# Patient Record
Sex: MALE | Race: WHITE | Employment: FULL TIME
[De-identification: names, ages, dates, MRNs, and addresses within clinical notes are randomized per-mention and may not be internally consistent; named-entity substitution may affect disease eponyms.]

---
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
## 2019-08-12 ENCOUNTER — APPOINTMENT (OUTPATIENT)
Dept: GENERAL RADIOLOGY | Facility: CLINIC | Age: 19
End: 2019-08-12
Attending: EMERGENCY MEDICINE
Payer: OTHER MISCELLANEOUS

## 2019-08-12 ENCOUNTER — HOSPITAL ENCOUNTER (EMERGENCY)
Facility: CLINIC | Age: 19
Discharge: HOME | End: 2019-08-12
Attending: EMERGENCY MEDICINE | Admitting: EMERGENCY MEDICINE
Payer: OTHER MISCELLANEOUS

## 2019-08-12 VITALS
TEMPERATURE: 98.4 F | HEART RATE: 94 BPM | DIASTOLIC BLOOD PRESSURE: 92 MMHG | OXYGEN SATURATION: 99 % | RESPIRATION RATE: 16 BRPM | SYSTOLIC BLOOD PRESSURE: 164 MMHG | WEIGHT: 220 LBS

## 2019-08-12 DIAGNOSIS — S69.91XA INJURY OF FINGER OF RIGHT HAND, INITIAL ENCOUNTER: ICD-10-CM

## 2019-08-12 DIAGNOSIS — S62.639B OPEN FRACTURE OF TUFT OF DISTAL PHALANX OF FINGER: ICD-10-CM

## 2019-08-12 DIAGNOSIS — S61.210A LACERATION OF RIGHT INDEX FINGER WITHOUT FOREIGN BODY, NAIL DAMAGE STATUS UNSPECIFIED, INITIAL ENCOUNTER: ICD-10-CM

## 2019-08-12 PROCEDURE — 26755 TREAT FINGER FRACTURE EACH: CPT | Mod: 54 | Performed by: EMERGENCY MEDICINE

## 2019-08-12 PROCEDURE — 90471 IMMUNIZATION ADMIN: CPT | Performed by: EMERGENCY MEDICINE

## 2019-08-12 PROCEDURE — 90715 TDAP VACCINE 7 YRS/> IM: CPT | Performed by: EMERGENCY MEDICINE

## 2019-08-12 PROCEDURE — 26755 TREAT FINGER FRACTURE EACH: CPT | Mod: F6 | Performed by: EMERGENCY MEDICINE

## 2019-08-12 PROCEDURE — 25000128 H RX IP 250 OP 636: Performed by: EMERGENCY MEDICINE

## 2019-08-12 PROCEDURE — 99284 EMERGENCY DEPT VISIT MOD MDM: CPT | Mod: 25 | Performed by: EMERGENCY MEDICINE

## 2019-08-12 PROCEDURE — 73140 X-RAY EXAM OF FINGER(S): CPT | Mod: RT

## 2019-08-12 RX ORDER — CEPHALEXIN 500 MG/1
500 CAPSULE ORAL 4 TIMES DAILY
Qty: 28 CAPSULE | Refills: 0 | Status: SHIPPED | OUTPATIENT
Start: 2019-08-12 | End: 2019-08-21

## 2019-08-12 RX ORDER — HYDROCODONE BITARTRATE AND ACETAMINOPHEN 5; 325 MG/1; MG/1
1 TABLET ORAL EVERY 6 HOURS PRN
Qty: 18 TABLET | Refills: 0 | Status: SHIPPED | OUTPATIENT
Start: 2019-08-12 | End: 2019-08-21

## 2019-08-12 RX ADMIN — CLOSTRIDIUM TETANI TOXOID ANTIGEN (FORMALDEHYDE INACTIVATED), CORYNEBACTERIUM DIPHTHERIAE TOXOID ANTIGEN (FORMALDEHYDE INACTIVATED), BORDETELLA PERTUSSIS TOXOID ANTIGEN (GLUTARALDEHYDE INACTIVATED), BORDETELLA PERTUSSIS FILAMENTOUS HEMAGGLUTININ ANTIGEN (FORMALDEHYDE INACTIVATED), BORDETELLA PERTUSSIS PERTACTIN ANTIGEN, AND BORDETELLA PERTUSSIS FIMBRIAE 2/3 ANTIGEN 0.5 ML: 5; 2; 2.5; 5; 3; 5 INJECTION, SUSPENSION INTRAMUSCULAR at 13:53

## 2019-08-12 NOTE — ED AVS SNAPSHOT
[facility]  [address] [address]  Phone:  [phone]  Fax:  [phone]                                    Wang Marcano   MRN: [id number]    Department:  [facility]   Date of Visit:  8/12/2019           After Visit Summary Signature Page    I have received my discharge instructions, and my questions have been answered. I have discussed any challenges I see with this plan with the nurse or doctor.    ..........................................................................................................................................  Patient/Patient Representative Signature      ..........................................................................................................................................  Patient Representative Print Name and Relationship to Patient    ..................................................               ................................................  Date                                   Time    ..........................................................................................................................................  Reviewed by Signature/Title    ...................................................              ..............................................  Date                                               Time          22EPIC Rev 08/18

## 2019-08-12 NOTE — ED PROVIDER NOTES
History     Chief Complaint   Patient presents with     Laceration     right hand digit #2 crushed at work at 1200     HPI  Wang Marcano is a 19 year old right-hand-dominant male who sustained a crush injury to his distal right index finger while at work resulting in laceration.  He was working on a well when his hand was struck with some of the moving metallic parts.  His finger was not entrapped.  Denies any other injuries.  Was in his usual state of health prior to the accident.  Denies any sniffing a past medical history, not taking medications, no known drug allergies.    Allergies:  Allergies   Allergen Reactions     No Known Drug Allergies        Problem List:    There are no active problems to display for this patient.       Past Medical History:    No past medical history on file.    Past Surgical History:    No past surgical history on file.    Family History:    No family history on file.    Social History:  Marital Status:  Single [1]  Social History     Tobacco Use     Smoking status: Never Smoker     Smokeless tobacco: Never Used   Substance Use Topics     Alcohol use: Not on file     Drug use: Not on file        Medications:      cephALEXin (KEFLEX) 500 MG capsule   HYDROcodone-acetaminophen (NORCO) 5-325 MG tablet         Review of Systems  A 10 point review of systems was performed and otherwise negative except as mentioned in HPI.     Physical Exam   BP: (!) 164/92  Pulse: 94  Temp: 98.4  F (36.9  C)  Resp: 16  Weight: 99.8 kg (220 lb)  SpO2: 99 %      Physical Exam  GEN: Awake, alert, and cooperative. No acute distress  HENT: Atraumatic  EYES: EOM intact. Conjunctiva clear.  CV :Regular rate and rhythm  PULM: Normal effort.  Lead  NEURO: No sensory deficit in right hand.  Two-point discrimination intact to all fingertips.  EXT:  3 cm curvilinear laceration from radial tip of long finger extending along lateral nail bed proximally and then along volar aspect of DIP. Soft tissue disruption.  Flexion and extension against resistance at PIP and DIP with isolation.           ED Course        [facility]    Laceration repair  Date/Time: 8/15/2019 11:06 AM  Performed by: Yuri Phillips MD  Authorized by: Yuri Phillips MD     ED EVALUATION:      I have performed an Emergency Department Evaluation including taking a history and physical examination, this evaluation will be documented in the electronic medical record for this ED encounter.      Difficult Airway?: No    ASA Class: Class 1- healthy patient    NPO Status: appropriately NPO for procedure  UNIVERSAL PROTOCOL   Site Marked: NA  Prior Images Obtained and Reviewed:  NA  Required items: Required blood products, implants, devices and special equipment available    Patient identity confirmed:  Verbally with patient, arm band, provided demographic data and hospital-assigned identification number  Patient was reevaluated immediately before administering moderate or deep sedation or anesthesia  Confirmation Checklist:  Patient's identity using two indicators, relevant allergies, procedure was appropriate and matched the consent or emergent situation and correct equipment/implants were available  Time out: Immediately prior to the procedure a time out was called    Preparation: Patient was prepped and draped in usual sterile fashion      ANESTHESIA (see MAR for exact dosages):     Anesthesia method:  Nerve block    Block location:  Right long finger    Block needle gauge:  27 G    Block anesthetic:  Bupivacaine 0.25% w/o epi    Block technique:  Digital    Block injection procedure:  Anatomic landmarks identified, introduced needle, incremental injection, negative aspiration for blood and anatomic landmarks palpated    Block outcome:  Anesthesia achieved        SEDATION    Patient Sedated: No    LACERATION DETAILS     Location:  Finger    Finger location:  R index finger    Length (cm):  3    Depth (mm):  10    REPAIR  TYPE:     Repair type:  Simple      EXPLORATION:     Hemostasis achieved with:  Direct pressure    Wound exploration: wound explored through full range of motion and entire depth of wound probed and visualized      Wound extent: underlying fracture      Wound extent: no foreign body      Contaminated: no      TREATMENT:     Amount of cleaning:  Extensive    Irrigation solution:  Tap water and sterile saline    Irrigation volume:  500    Irrigation method:  Syringe and tap    Visualized foreign bodies/material removed: no      SKIN REPAIR     Repair method:  Sutures    Suture size:  4-0    Suture material:  Nylon    Suture technique:  Simple interrupted    Number of sutures:  10    APPROXIMATION     Approximation:  Loose    POST-PROCEDURE DETAILS     Dressing:  Antibiotic ointment, non-adherent dressing, splint for protection, sterile dressing and tube gauze      PROCEDURE   Patient Tolerance:  Patient tolerated the procedure well with no immediate complications    Time of Sedation in Minutes by Physician:  0                 Critical Care time:  none               No results found for this or any previous visit (from the past 24 hour(s)).    Medications   Tdap (tetanus-diphtheria-acell pertussis) (ADACEL) injection 0.5 mL (0.5 mLs Intramuscular Given 8/12/19 6971)       Assessments & Plan (with Medical Decision Making)   19 year old right hand dominant male with work related injury to long finger of right hand. Imaging notable for open comminuted fracture of distal phalanx. Wound thoroughly irrigated and explored, closed primarily. Finger splinted in extension and will need to follow up with orthopedics within one week. Referral placed. Tetanus updated. Cephalexin for empiric coverage. Norco for breakthrough pain. Wound care and return precautions discussed.     I have reviewed the nursing notes.    I have reviewed the findings, diagnosis, plan and need for follow up with the patient.       Discharge Medication List  as of 8/13/2019  4:19 PM      START taking these medications    Details   cephALEXin (KEFLEX) 500 MG capsule Take 1 capsule (500 mg) by mouth 4 times daily for 7 days, Disp-28 capsule, R-0, E-Prescribe      HYDROcodone-acetaminophen (NORCO) 5-325 MG tablet Take 1 tablet by mouth every 6 hours as needed for pain, Disp-18 tablet, R-0, Local Print             Final diagnoses:   Injury of finger of right hand, initial encounter   Open fracture of tuft of distal phalanx of finger   Laceration of right index finger without foreign body, nail damage status unspecified, initial encounter     Yuri Phillips MD           8/12/2019   [facility] EMERGENCY DEPARTMENT     Yuri Phillips MD  08/15/19 [id number]

## 2019-08-12 NOTE — DISCHARGE INSTRUCTIONS
You will need to follow-up with orthopedic specialist in 1 to 3 days.  Take Keflex as directed, be sure to take the complete prescription.  Keep your finger clean and dry and elevate when possible.  Take acetaminophen for pain and she may take Norco for breakthrough pain.  If your pain becomes severe, you have increasing swelling, or redness, or discharge from the wound please return to the emergency room immediately for further evaluation.  Do not drive a vehicle or operate heavy machinery while taking narcotic pain medications.

## 2019-08-12 NOTE — ED NOTES
Patient crushed right pointer finger while at work today.  Has avulsion injury to finger tip of finger to middle joint.  Stated placed skin back together.  CMS good  Swelling noted.  Blood under nail.

## 2019-08-20 NOTE — PROGRESS NOTES
"SUBJECTIVE:   Wang Marcano is a 19 year old male who is seen per [facility] ED department for evaluation of a work related right index finger crush injury that occurred on 8/12/2019.     Mechanism of injury:.  He was working on a well when his hand was struck with some of the moving metallic parts.    Present symptoms: pain if finger is touched.    Treatments tried to this point: Finger wound was irrigated, and laceration was closed.  He was placed on 500 mg of Keflex 4 times a day x7 days    Review of Systems:  Constitutional:  NEGATIVE for fever, chills, change in weight  Integumentary/Skin:  NEGATIVE for worrisome rashes, moles or lesions  Eyes:  NEGATIVE for vision changes or irritation  ENT/Mouth:  NEGATIVE for ear, mouth and throat problems  Resp:  NEGATIVE for significant cough or SOB  Breast:  NEGATIVE for masses, tenderness or discharge  CV:  NEGATIVE for chest pain, palpitations or peripheral edema  GI:  NEGATIVE for nausea, abdominal pain, heartburn, or change in bowel habits  :  Negative   Musculoskeletal:  See HPI above  Neuro:  NEGATIVE for weakness, dizziness or paresthesias  Endocrine:  NEGATIVE for temperature intolerance, skin/hair changes  Heme/allergy/immune:  NEGATIVE for bleeding problems  Psychiatric:  NEGATIVE for changes in mood or affect    Past Medical History: No past medical history on file.  Past Surgical History: No past surgical history on file.  Family History: No family history on file.  Social History:   Social History     Tobacco Use     Smoking status: Never Smoker     Smokeless tobacco: Never Used   Substance Use Topics     Alcohol use: Not on file     OBJECTIVE:  Physical Exam:  /82 (BP Location: Left arm, Patient Position: Sitting, Cuff Size: Adult Regular)   Pulse 83   Ht 1.956 m (6' 5\")   Wt 99.8 kg (220 lb)   SpO2 97%   BMI 26.09 kg/m    General Appearance: healthy, alert and no distress   Skin: no suspicious lesions or rashes  Neuro: Normal strength and tone, " mentation intact and speech normal  Vascular: good pulses, and cappillary refill   Lymph: no lymphadenopathy   Psych:  mentation appears normal and affect normal/bright  Resp: no increased work of breathing     Right Hand Exam:  Inspection: index finger tip with laceration up radial side, extending across distal tip, and across ulnar edge of the nail  Some marginal necrosis on flap  No evidence of infection.  Able to  Flex and extend DIP joint.  Tender: very     X-rays:  Comminuted tuft fracture      ASSESSMENT:   Open fracture right index tuft  Doing well    PLAN:   Keep clean and dry. Protect with splint  Return to clinic next week for sutures out.      Return to clinic: next week.    HEATHER Booker MD  Dept. Orthopedic Surgery  [facility]

## 2019-08-21 ENCOUNTER — OFFICE VISIT (OUTPATIENT)
Dept: ORTHOPEDICS | Facility: OTHER | Age: 19
End: 2019-08-21
Payer: OTHER MISCELLANEOUS

## 2019-08-21 VITALS
WEIGHT: 220 LBS | DIASTOLIC BLOOD PRESSURE: 82 MMHG | HEIGHT: 77 IN | BODY MASS INDEX: 25.98 KG/M2 | SYSTOLIC BLOOD PRESSURE: 129 MMHG | OXYGEN SATURATION: 97 % | HEART RATE: 83 BPM

## 2019-08-21 DIAGNOSIS — S62.639B OPEN FRACTURE OF TUFT OF DISTAL PHALANX OF FINGER: Primary | ICD-10-CM

## 2019-08-21 PROCEDURE — 99203 OFFICE O/P NEW LOW 30 MIN: CPT | Performed by: ORTHOPAEDIC SURGERY

## 2019-08-21 SDOH — HEALTH STABILITY: MENTAL HEALTH: HOW OFTEN DO YOU HAVE A DRINK CONTAINING ALCOHOL?: NEVER

## 2019-08-21 ASSESSMENT — MIFFLIN-ST. JEOR: SCORE: 2125.29

## 2019-08-21 NOTE — LETTER
8/21/2019         RE: Wang Marcano  [address] [facility] [address]        Dear Colleague,    Thank you for referring your patient, Wang Marcano, to the [facility]. Please see a copy of my visit note below.    SUBJECTIVE:   Wang Marcano is a 19 year old male who is seen per [facility] ED department for evaluation of a work related right index finger crush injury that occurred on 8/12/2019.     Mechanism of injury:.  He was working on a well when his hand was struck with some of the moving metallic parts.    Present symptoms: pain if finger is touched.    Treatments tried to this point: Finger wound was irrigated, and laceration was closed.  He was placed on 500 mg of Keflex 4 times a day x7 days    Review of Systems:  Constitutional:  NEGATIVE for fever, chills, change in weight  Integumentary/Skin:  NEGATIVE for worrisome rashes, moles or lesions  Eyes:  NEGATIVE for vision changes or irritation  ENT/Mouth:  NEGATIVE for ear, mouth and throat problems  Resp:  NEGATIVE for significant cough or SOB  Breast:  NEGATIVE for masses, tenderness or discharge  CV:  NEGATIVE for chest pain, palpitations or peripheral edema  GI:  NEGATIVE for nausea, abdominal pain, heartburn, or change in bowel habits  :  Negative   Musculoskeletal:  See HPI above  Neuro:  NEGATIVE for weakness, dizziness or paresthesias  Endocrine:  NEGATIVE for temperature intolerance, skin/hair changes  Heme/allergy/immune:  NEGATIVE for bleeding problems  Psychiatric:  NEGATIVE for changes in mood or affect    Past Medical History: No past medical history on file.  Past Surgical History: No past surgical history on file.  Family History: No family history on file.  Social History:   Social History     Tobacco Use     Smoking status: Never Smoker     Smokeless tobacco: Never Used   Substance Use Topics     Alcohol use: Not on file     OBJECTIVE:  Physical Exam:  /82 (BP Location: Left arm, Patient Position: Sitting,  "Cuff Size: Adult Regular)   Pulse 83   Ht 1.956 m (6' 5\")   Wt 99.8 kg (220 lb)   SpO2 97%   BMI 26.09 kg/m     General Appearance: healthy, alert and no distress   Skin: no suspicious lesions or rashes  Neuro: Normal strength and tone, mentation intact and speech normal  Vascular: good pulses, and cappillary refill   Lymph: no lymphadenopathy   Psych:  mentation appears normal and affect normal/bright  Resp: no increased work of breathing     Right Hand Exam:  Inspection: index finger tip with laceration up radial side, extending across distal tip, and across ulnar edge of the nail  Some marginal necrosis on flap  No evidence of infection.  Able to  Flex and extend DIP joint.  Tender: very     X-rays:  Comminuted tuft fracture      ASSESSMENT:   Open fracture right index tuft  Doing well    PLAN:   Keep clean and dry. Protect with splint  Return to clinic next week for sutures out.      Return to clinic: next week.    HEATHER Booker MD  Dept. Orthopedic Surgery  [facility]       Again, thank you for allowing me to participate in the care of your patient.        Sincerely,        Murtaza Booker MD    "

## 2019-08-28 ENCOUNTER — OFFICE VISIT (OUTPATIENT)
Dept: ORTHOPEDICS | Facility: OTHER | Age: 19
End: 2019-08-28
Payer: OTHER MISCELLANEOUS

## 2019-08-28 VITALS
DIASTOLIC BLOOD PRESSURE: 75 MMHG | BODY MASS INDEX: 25.98 KG/M2 | HEART RATE: 67 BPM | SYSTOLIC BLOOD PRESSURE: 131 MMHG | OXYGEN SATURATION: 98 % | WEIGHT: 220 LBS | HEIGHT: 77 IN

## 2019-08-28 DIAGNOSIS — S62.639B OPEN FRACTURE OF TUFT OF DISTAL PHALANX OF FINGER: Primary | ICD-10-CM

## 2019-08-28 PROCEDURE — 99213 OFFICE O/P EST LOW 20 MIN: CPT | Performed by: ORTHOPAEDIC SURGERY

## 2019-08-28 ASSESSMENT — MIFFLIN-ST. JEOR: SCORE: 2125.29

## 2019-08-28 NOTE — LETTER
"    8/28/2019         RE: Wang Marcano  [address] [facility] [address]        Dear Colleague,    Thank you for referring your patient, Wang Marcano, to the [facility]. Please see a copy of my visit note below.    SUBJECTIVE:   Wang Marcano is here for follow up of a work related right index finger crush injury that occurred on 8/12/2019.     Present symptoms: less pain but still pain if finger is touched.  He's done with his antibiotics.    Treatments tried to this point: Finger wound was irrigated, and laceration was closed.  He was placed on 500 mg of Keflex 4 times a day x7 days    Review of Systems:  Constitutional:  NEGATIVE for fever, chills, change in weight  Integumentary/Skin:  NEGATIVE for worrisome rashes, moles or lesions  Eyes:  NEGATIVE for vision changes or irritation  ENT/Mouth:  NEGATIVE for ear, mouth and throat problems  Resp:  NEGATIVE for significant cough or SOB  Breast:  NEGATIVE for masses, tenderness or discharge  CV:  NEGATIVE for chest pain, palpitations or peripheral edema  GI:  NEGATIVE for nausea, abdominal pain, heartburn, or change in bowel habits  :  Negative   Musculoskeletal:  See HPI above  Neuro:  NEGATIVE for weakness, dizziness or paresthesias  Endocrine:  NEGATIVE for temperature intolerance, skin/hair changes  Heme/allergy/immune:  NEGATIVE for bleeding problems  Psychiatric:  NEGATIVE for changes in mood or affect    Past Medical History: No past medical history on file.  Past Surgical History: No past surgical history on file.  Family History: No family history on file.  Social History:   Social History     Tobacco Use     Smoking status: Current Some Day Smoker     Smokeless tobacco: Never Used   Substance Use Topics     Alcohol use: Never     Frequency: Never     OBJECTIVE:  Physical Exam:  /75 (BP Location: Left arm, Patient Position: Sitting, Cuff Size: Adult Large)   Pulse 67   Ht 1.956 m (6' 5\")   Wt 99.8 kg (220 lb)   SpO2 98%  "  BMI 26.09 kg/m     General Appearance: healthy, alert and no distress   Skin: no suspicious lesions or rashes  Neuro: Normal strength and tone, mentation intact and speech normal  Vascular: good pulses, and cappillary refill   Lymph: no lymphadenopathy   Psych:  mentation appears normal and affect normal/bright  Resp: no increased work of breathing     Right Hand Exam:  Inspection: index finger tip with laceration up radial side, extending across distal tip, and across ulnar edge of the nail  Some marginal necrosis on flap--improved  No evidence of infection.  Able to  Flex and extend DIP joint.  Tender: very     X-rays:  Comminuted tuft fracture      ASSESSMENT:   Open fracture right index tuft  Doing well    PLAN:   Keep clean and dry. Protect with splint if he tolerates  sutures out today      Return to clinic: 2 weeks     HEATHER Booker MD  Dept. Orthopedic Surgery  [facility]       Again, thank you for allowing me to participate in the care of your patient.        Sincerely,        Murtaza Booker MD

## 2019-08-28 NOTE — PROGRESS NOTES
"SUBJECTIVE:   Wang Marcano is here for follow up of a work related right index finger crush injury that occurred on 8/12/2019.     Present symptoms: less pain but still pain if finger is touched.  He's done with his antibiotics.    Treatments tried to this point: Finger wound was irrigated, and laceration was closed.  He was placed on 500 mg of Keflex 4 times a day x7 days    Review of Systems:  Constitutional:  NEGATIVE for fever, chills, change in weight  Integumentary/Skin:  NEGATIVE for worrisome rashes, moles or lesions  Eyes:  NEGATIVE for vision changes or irritation  ENT/Mouth:  NEGATIVE for ear, mouth and throat problems  Resp:  NEGATIVE for significant cough or SOB  Breast:  NEGATIVE for masses, tenderness or discharge  CV:  NEGATIVE for chest pain, palpitations or peripheral edema  GI:  NEGATIVE for nausea, abdominal pain, heartburn, or change in bowel habits  :  Negative   Musculoskeletal:  See HPI above  Neuro:  NEGATIVE for weakness, dizziness or paresthesias  Endocrine:  NEGATIVE for temperature intolerance, skin/hair changes  Heme/allergy/immune:  NEGATIVE for bleeding problems  Psychiatric:  NEGATIVE for changes in mood or affect    Past Medical History: No past medical history on file.  Past Surgical History: No past surgical history on file.  Family History: No family history on file.  Social History:   Social History     Tobacco Use     Smoking status: Current Some Day Smoker     Smokeless tobacco: Never Used   Substance Use Topics     Alcohol use: Never     Frequency: Never     OBJECTIVE:  Physical Exam:  /75 (BP Location: Left arm, Patient Position: Sitting, Cuff Size: Adult Large)   Pulse 67   Ht 1.956 m (6' 5\")   Wt 99.8 kg (220 lb)   SpO2 98%   BMI 26.09 kg/m    General Appearance: healthy, alert and no distress   Skin: no suspicious lesions or rashes  Neuro: Normal strength and tone, mentation intact and speech normal  Vascular: good pulses, and cappillary refill   Lymph: no " lymphadenopathy   Psych:  mentation appears normal and affect normal/bright  Resp: no increased work of breathing     Right Hand Exam:  Inspection: index finger tip with laceration up radial side, extending across distal tip, and across ulnar edge of the nail  Some marginal necrosis on flap--improved  No evidence of infection.  Able to  Flex and extend DIP joint.  Tender: very     X-rays:  Comminuted tuft fracture      ASSESSMENT:   Open fracture right index tuft  Doing well    PLAN:   Keep clean and dry. Protect with splint if he tolerates  sutures out today      Return to clinic: 2 weeks     HEATHER Booker MD  Dept. Orthopedic Surgery  [facility]

## 2019-09-06 NOTE — PROGRESS NOTES
"SUBJECTIVE:   Wang Marcano is here for follow up of a work related right index finger crush injury that occurred on 8/12/2019.      Present symptoms: minimal pain now if touched.  He peeled off the necrotic skin layer.      Treatments tried to this point: Finger wound was irrigated, and laceration was closed.  He was placed on 500 mg of Keflex 4 times a day x7 days      Review of Systems:  Constitutional:  NEGATIVE for fever, chills, change in weight  Integumentary/Skin:  NEGATIVE for worrisome rashes, moles or lesions  Eyes:  NEGATIVE for vision changes or irritation  ENT/Mouth:  NEGATIVE for ear, mouth and throat problems  Resp:  NEGATIVE for significant cough or SOB  Breast:  NEGATIVE for masses, tenderness or discharge  CV:  NEGATIVE for chest pain, palpitations or peripheral edema  GI:  NEGATIVE for nausea, abdominal pain, heartburn, or change in bowel habits  :  Negative   Musculoskeletal:  See HPI above  Neuro:  NEGATIVE for weakness, dizziness or paresthesias  Endocrine:  NEGATIVE for temperature intolerance, skin/hair changes  Heme/allergy/immune:  NEGATIVE for bleeding problems  Psychiatric:  NEGATIVE for changes in mood or affect     Past Medical History:   Past Medical History   No past medical history on file.     Past Surgical History:   Past Surgical History   No past surgical history on file.     Family History:   Family History   No family history on file.     Social History:   Social History            Tobacco Use     Smoking status: Current Some Day Smoker     Smokeless tobacco: Never Used   Substance Use Topics     Alcohol use: Never       Frequency: Never      OBJECTIVE:  Physical Exam:  /75 (BP Location: Left arm, Patient Position: Sitting, Cuff Size: Adult Large)   Pulse 67   Ht 1.956 m (6' 5\")   Wt 99.8 kg (220 lb)   SpO2 98%   BMI 26.09 kg/m    General Appearance: healthy, alert and no distress   Skin: no suspicious lesions or rashes  Neuro: Normal strength and tone, mentation " intact and speech normal  Vascular: good pulses, and cappillary refill   Lymph: no lymphadenopathy   Psych:  mentation appears normal and affect normal/bright  Resp: no increased work of breathing      Right Hand Exam:   Inspection: index finger tip with healed laceration up radial side, extending across distal tip, and across ulnar edge of the nail  The marginal necrosis on flap has shed, and now has normal skin on the flap  No evidence of infection.  Able to  Flex and extend DIP joint.  Nail is still black  Tender: slightly on tip       ASSESSMENT:   Open fracture right index tuft right index  Doing well  Wound healed.     PLAN:   Protect with splint, as needed   We discussed desensitization  Return to work full duty.         Return to clinic:  as needed

## 2019-09-11 ENCOUNTER — OFFICE VISIT (OUTPATIENT)
Dept: ORTHOPEDICS | Facility: OTHER | Age: 19
End: 2019-09-11
Payer: OTHER MISCELLANEOUS

## 2019-09-11 VITALS
HEART RATE: 66 BPM | BODY MASS INDEX: 25.98 KG/M2 | OXYGEN SATURATION: 99 % | WEIGHT: 220 LBS | SYSTOLIC BLOOD PRESSURE: 143 MMHG | DIASTOLIC BLOOD PRESSURE: 83 MMHG | HEIGHT: 77 IN

## 2019-09-11 DIAGNOSIS — S62.639B OPEN FRACTURE OF TUFT OF DISTAL PHALANX OF FINGER: Primary | ICD-10-CM

## 2019-09-11 PROCEDURE — 99213 OFFICE O/P EST LOW 20 MIN: CPT | Performed by: ORTHOPAEDIC SURGERY

## 2019-09-11 ASSESSMENT — MIFFLIN-ST. JEOR: SCORE: 2125.29

## 2019-09-11 NOTE — LETTER
9/11/2019         RE: Wang Marcano  [address] [facility] [address]        Dear Colleague,    Thank you for referring your patient, Wang Marcano, to the [facility]. Please see a copy of my visit note below.    SUBJECTIVE:   Wang Marcano is here for follow up of a work related right index finger crush injury that occurred on 8/12/2019.      Present symptoms: minimal pain now if touched.  He peeled off the necrotic skin layer.      Treatments tried to this point: Finger wound was irrigated, and laceration was closed.  He was placed on 500 mg of Keflex 4 times a day x7 days      Review of Systems:  Constitutional:  NEGATIVE for fever, chills, change in weight  Integumentary/Skin:  NEGATIVE for worrisome rashes, moles or lesions  Eyes:  NEGATIVE for vision changes or irritation  ENT/Mouth:  NEGATIVE for ear, mouth and throat problems  Resp:  NEGATIVE for significant cough or SOB  Breast:  NEGATIVE for masses, tenderness or discharge  CV:  NEGATIVE for chest pain, palpitations or peripheral edema  GI:  NEGATIVE for nausea, abdominal pain, heartburn, or change in bowel habits  :  Negative   Musculoskeletal:  See HPI above  Neuro:  NEGATIVE for weakness, dizziness or paresthesias  Endocrine:  NEGATIVE for temperature intolerance, skin/hair changes  Heme/allergy/immune:  NEGATIVE for bleeding problems  Psychiatric:  NEGATIVE for changes in mood or affect     Past Medical History:   Past Medical History   No past medical history on file.     Past Surgical History:   Past Surgical History   No past surgical history on file.     Family History:   Family History   No family history on file.     Social History:   Social History            Tobacco Use     Smoking status: Current Some Day Smoker     Smokeless tobacco: Never Used   Substance Use Topics     Alcohol use: Never       Frequency: Never      OBJECTIVE:  Physical Exam:  /75 (BP Location: Left arm, Patient Position: Sitting, Cuff Size:  "Adult Large)   Pulse 67   Ht 1.956 m (6' 5\")   Wt 99.8 kg (220 lb)   SpO2 98%   BMI 26.09 kg/m    General Appearance: healthy, alert and no distress   Skin: no suspicious lesions or rashes  Neuro: Normal strength and tone, mentation intact and speech normal  Vascular: good pulses, and cappillary refill   Lymph: no lymphadenopathy   Psych:  mentation appears normal and affect normal/bright  Resp: no increased work of breathing      Right Hand Exam:   Inspection: index finger tip with healed laceration up radial side, extending across distal tip, and across ulnar edge of the nail  The marginal necrosis on flap has shed, and now has normal skin on the flap  No evidence of infection.  Able to  Flex and extend DIP joint.  Nail is still black  Tender: slightly on tip       ASSESSMENT:   Open fracture right index tuft right index  Doing well  Wound healed.     PLAN:   Protect with splint, as needed   We discussed desensitization  Return to work full duty.         Return to clinic:  as needed       Again, thank you for allowing me to participate in the care of your patient.        Sincerely,        Murtaza Booker MD    "